# Patient Record
Sex: MALE | Race: WHITE
[De-identification: names, ages, dates, MRNs, and addresses within clinical notes are randomized per-mention and may not be internally consistent; named-entity substitution may affect disease eponyms.]

---

## 2018-04-01 ENCOUNTER — HOSPITAL ENCOUNTER (EMERGENCY)
Dept: HOSPITAL 11 - JP.ED | Age: 63
Discharge: HOME | End: 2018-04-01
Payer: SELF-PAY

## 2018-04-01 DIAGNOSIS — K02.9: Primary | ICD-10-CM

## 2018-04-01 DIAGNOSIS — F17.210: ICD-10-CM

## 2018-04-01 DIAGNOSIS — Z88.0: ICD-10-CM

## 2018-04-01 NOTE — EDM.PDOC
ED HPI GENERAL MEDICAL PROBLEM





- General


Chief Complaint: ENT Problem


Stated Complaint: TOOTH PAIN RT SIDE


Time Seen by Provider: 04/01/18 08:58


Source of Information: Reports: Patient


History Limitations: Reports: No Limitations





- History of Present Illness


INITIAL COMMENTS - FREE TEXT/NARRATIVE: 





This gentleman comes in because of dental pain area he said a filling fell out 

on one of his right lower premolars and he's been putting some temporary 

filling material in it. He told the nurse that this is been going on for about 

a year but now it's hurting very bad. He said he's going to see a dentist 

tomorrow. He said he's allergic to penicillin but he can take amoxicillin. He 

said he doesn't know why that is but he's always been able to take amoxicillin 

without any problems.





- Related Data


 Allergies











Allergy/AdvReac Type Severity Reaction Status Date / Time


 


Penicillins Allergy  Rash Verified 04/01/18 08:43











Home Meds: 


 Home Meds





NK [No Known Home Meds]  04/01/18 [History]











Past Medical History





- Past Health History


Medical/Surgical History: Denies Medical/Surgical History





Social & Family History





- Tobacco Use


Smoking Status *Q: Current Every Day Smoker


Years of Tobacco use: 40


Packs/Tins Daily: 0.7





ED ROS ENT





- Review of Systems


Review Of Systems: ROS reveals no pertinent complaints other than HPI.





ED EXAM, ENT





- Physical Exam


Exam: See Below


Exam Limited By: No Limitations


General Appearance: Alert, WD/WN, Mild Distress


Mouth/Throat: Other (He does have several carious teeth the one in question is 

a right lower premolar and it has very large caries in it and there's a piece 

of temporary filling material stuff down in to the crater. No obvious abscess.)





Course





- Vital Signs


Last Recorded V/S: 





 Last Vital Signs











Temp  36.9 C   04/01/18 08:47


 


Pulse  84   04/01/18 08:47


 


Resp  14   04/01/18 08:47


 


BP  187/100 H  04/01/18 08:47


 


Pulse Ox  98   04/01/18 08:47














Departure





- Departure


Time of Disposition: 09:01


Disposition: Home, Self-Care 01


Condition: Fair


Clinical Impression: 


 Dental caries








- Discharge Information


Referrals: 


PCP,None [Primary Care Provider] - 


Additional Instructions: 


Take amoxicillin 500 mg 3 times daily for 1 week. For pain take Percocet 5/325 

one or 2 every 4 hours as needed, #15. This medication can cause sedation and 

impair driving or operating machinery. See the dentist tomorrow as planned

## 2023-03-29 ENCOUNTER — HOSPITAL ENCOUNTER (OUTPATIENT)
Dept: HOSPITAL 11 - JP.ED | Age: 68
Setting detail: OBSERVATION
Discharge: HOME | End: 2023-03-29
Attending: INTERNAL MEDICINE | Admitting: INTERNAL MEDICINE
Payer: MEDICARE

## 2023-03-29 DIAGNOSIS — R55: Primary | ICD-10-CM

## 2023-03-29 DIAGNOSIS — Z88.0: ICD-10-CM

## 2023-03-29 DIAGNOSIS — Z79.82: ICD-10-CM

## 2023-03-29 DIAGNOSIS — Z98.890: ICD-10-CM

## 2023-03-29 DIAGNOSIS — N17.9: ICD-10-CM

## 2023-03-29 DIAGNOSIS — I95.89: ICD-10-CM

## 2023-03-29 DIAGNOSIS — I10: ICD-10-CM

## 2023-03-29 DIAGNOSIS — E86.1: ICD-10-CM

## 2023-03-29 DIAGNOSIS — F17.210: ICD-10-CM

## 2023-03-29 DIAGNOSIS — E78.00: ICD-10-CM

## 2023-03-29 DIAGNOSIS — E86.0: ICD-10-CM

## 2023-03-29 DIAGNOSIS — Z20.822: ICD-10-CM

## 2023-03-29 DIAGNOSIS — Z79.899: ICD-10-CM

## 2023-03-29 LAB
SARS-COV-2 RNA RESP QL NAA+PROBE: NEGATIVE
TROPONIN I SERPL HS-MCNC: 17.1 PG/ML (ref ?–60.3)

## 2023-03-29 PROCEDURE — 93005 ELECTROCARDIOGRAM TRACING: CPT

## 2023-03-29 PROCEDURE — 85730 THROMBOPLASTIN TIME PARTIAL: CPT

## 2023-03-29 PROCEDURE — 96365 THER/PROPH/DIAG IV INF INIT: CPT

## 2023-03-29 PROCEDURE — 96361 HYDRATE IV INFUSION ADD-ON: CPT

## 2023-03-29 PROCEDURE — G0378 HOSPITAL OBSERVATION PER HR: HCPCS

## 2023-03-29 PROCEDURE — 0241U: CPT

## 2023-03-29 PROCEDURE — 74175 CTA ABDOMEN W/CONTRAST: CPT

## 2023-03-29 PROCEDURE — U0002 COVID-19 LAB TEST NON-CDC: HCPCS

## 2023-03-29 PROCEDURE — 96375 TX/PRO/DX INJ NEW DRUG ADDON: CPT

## 2023-03-29 PROCEDURE — 87635 SARS-COV-2 COVID-19 AMP PRB: CPT

## 2023-03-29 PROCEDURE — 84145 PROCALCITONIN (PCT): CPT

## 2023-03-29 PROCEDURE — 81001 URINALYSIS AUTO W/SCOPE: CPT

## 2023-03-29 PROCEDURE — 85610 PROTHROMBIN TIME: CPT

## 2023-03-29 PROCEDURE — 84484 ASSAY OF TROPONIN QUANT: CPT

## 2023-03-29 PROCEDURE — 36415 COLL VENOUS BLD VENIPUNCTURE: CPT

## 2023-03-29 PROCEDURE — 87040 BLOOD CULTURE FOR BACTERIA: CPT

## 2023-03-29 PROCEDURE — 86140 C-REACTIVE PROTEIN: CPT

## 2023-03-29 PROCEDURE — 99285 EMERGENCY DEPT VISIT HI MDM: CPT

## 2023-03-29 PROCEDURE — 80053 COMPREHEN METABOLIC PANEL: CPT

## 2023-03-29 PROCEDURE — 71275 CT ANGIOGRAPHY CHEST: CPT

## 2023-03-29 PROCEDURE — 70450 CT HEAD/BRAIN W/O DYE: CPT

## 2023-03-29 PROCEDURE — 85025 COMPLETE CBC W/AUTO DIFF WBC: CPT

## 2023-03-29 PROCEDURE — 83605 ASSAY OF LACTIC ACID: CPT

## 2023-03-29 PROCEDURE — 96376 TX/PRO/DX INJ SAME DRUG ADON: CPT

## 2023-03-29 RX ADMIN — IOPAMIDOL STA ML: 755 INJECTION, SOLUTION INTRAVENOUS at 05:25

## 2023-03-29 RX ADMIN — IOPAMIDOL STA ML: 755 INJECTION, SOLUTION INTRAVENOUS at 07:25

## 2023-03-29 RX ADMIN — IOPAMIDOL STA ML: 755 INJECTION, SOLUTION INTRAVENOUS at 05:00

## 2025-01-25 ENCOUNTER — HOSPITAL ENCOUNTER (EMERGENCY)
Dept: HOSPITAL 11 - JP.ED | Age: 70
Discharge: HOME | End: 2025-01-25
Payer: OTHER GOVERNMENT

## 2025-01-25 DIAGNOSIS — Z86.16: ICD-10-CM

## 2025-01-25 DIAGNOSIS — I10: ICD-10-CM

## 2025-01-25 DIAGNOSIS — E78.00: ICD-10-CM
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
## 2025-01-25 DIAGNOSIS — Z79.82: ICD-10-CM

## 2025-01-25 DIAGNOSIS — Z88.0: ICD-10-CM

## 2025-01-25 DIAGNOSIS — Z79.899: ICD-10-CM

## 2025-01-25 DIAGNOSIS — S61.412A: Primary | ICD-10-CM

## 2025-01-25 RX ADMIN — BACITRACIN ZINC ONE DOSE: 500 OINTMENT TOPICAL at 12:02
